# Patient Record
Sex: FEMALE | Race: BLACK OR AFRICAN AMERICAN | NOT HISPANIC OR LATINO | URBAN - METROPOLITAN AREA
[De-identification: names, ages, dates, MRNs, and addresses within clinical notes are randomized per-mention and may not be internally consistent; named-entity substitution may affect disease eponyms.]

---

## 2019-06-12 ENCOUNTER — EMERGENCY (EMERGENCY)
Facility: HOSPITAL | Age: 29
LOS: 1 days | Discharge: HOME | End: 2019-06-12
Admitting: EMERGENCY MEDICINE
Payer: OTHER MISCELLANEOUS

## 2019-06-12 VITALS
HEART RATE: 71 BPM | DIASTOLIC BLOOD PRESSURE: 84 MMHG | RESPIRATION RATE: 17 BRPM | TEMPERATURE: 98 F | OXYGEN SATURATION: 98 % | SYSTOLIC BLOOD PRESSURE: 125 MMHG

## 2019-06-12 PROCEDURE — 12002 RPR S/N/AX/GEN/TRNK2.6-7.5CM: CPT | Mod: RT

## 2019-06-12 PROCEDURE — 99283 EMERGENCY DEPT VISIT LOW MDM: CPT | Mod: 25

## 2019-06-12 RX ORDER — ACETAMINOPHEN 500 MG
650 TABLET ORAL ONCE
Refills: 0 | Status: COMPLETED | OUTPATIENT
Start: 2019-06-12 | End: 2019-06-12

## 2019-06-12 RX ADMIN — Medication 650 MILLIGRAM(S): at 13:31

## 2019-06-12 NOTE — ED PROVIDER NOTE - CLINICAL SUMMARY MEDICAL DECISION MAKING FREE TEXT BOX
28 year old female with right hand laceration, uncomplicated without clinical evidence of tendon, or nerve injury; tetanus is up-to-date. Plan for lac repair with non-absorbable 5.0 sutures.

## 2019-06-12 NOTE — ED PROVIDER NOTE - SKIN WOUND TYPE
over right dorsum near base of 4th and 5th metacarpal 5cm laceration through the subcutaneous fat, explored for foreign body and none noted, no tendon injury, sensory and motor intact/LACERATION(S)

## 2019-06-12 NOTE — ED PROVIDER NOTE - NSFOLLOWUPINSTRUCTIONS_ED_ALL_ED_FT
Please follow up with your primary care doctor or return to the emergency department for removal of your sutures in one week. If you have any new, worsened or concerning symptoms, please return to the emergency department immediately.

## 2019-06-12 NOTE — ED ADULT TRIAGE NOTE - CHIEF COMPLAINT QUOTE
c/o rt hand laceration over knuckle from cut on piece of metal at work, arrives with bandage to hand, rolled gauze applied for stability, bleeding controlled in triage

## 2020-08-31 NOTE — ED PROCEDURE NOTE - CPROC ED LACERATION CLEANSED1
This is not a current patient of ours - please verify w/ management how to proceed w/ urgent referral request for a non-established patient   copious irrigation/cleansed